# Patient Record
Sex: FEMALE | Race: WHITE | NOT HISPANIC OR LATINO | Employment: FULL TIME | URBAN - METROPOLITAN AREA
[De-identification: names, ages, dates, MRNs, and addresses within clinical notes are randomized per-mention and may not be internally consistent; named-entity substitution may affect disease eponyms.]

---
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
## 2020-05-27 ENCOUNTER — PROCEDURE VISIT (OUTPATIENT)
Dept: OBSTETRICS AND GYNECOLOGY | Facility: CLINIC | Age: 20
End: 2020-05-27

## 2020-05-27 VITALS
SYSTOLIC BLOOD PRESSURE: 118 MMHG | DIASTOLIC BLOOD PRESSURE: 68 MMHG | HEIGHT: 71 IN | HEART RATE: 78 BPM | TEMPERATURE: 98.6 F | WEIGHT: 293 LBS | BODY MASS INDEX: 41.02 KG/M2

## 2020-05-27 DIAGNOSIS — N63.10 LUMP OF RIGHT BREAST: ICD-10-CM

## 2020-05-27 DIAGNOSIS — Z97.5 BREAKTHROUGH BLEEDING ON NEXPLANON: Primary | ICD-10-CM

## 2020-05-27 DIAGNOSIS — Z30.46 ENCOUNTER FOR NEXPLANON REMOVAL: ICD-10-CM

## 2020-05-27 DIAGNOSIS — N92.1 BREAKTHROUGH BLEEDING ON NEXPLANON: Primary | ICD-10-CM

## 2020-05-27 PROCEDURE — 99213 OFFICE O/P EST LOW 20 MIN: CPT | Performed by: NURSE PRACTITIONER

## 2020-05-27 PROCEDURE — 11982 REMOVE DRUG IMPLANT DEVICE: CPT | Performed by: NURSE PRACTITIONER

## 2020-05-27 RX ORDER — TOPIRAMATE 100 MG/1
TABLET, FILM COATED ORAL
COMMUNITY
Start: 2020-04-27

## 2020-05-27 RX ORDER — OMEPRAZOLE 20 MG/1
CAPSULE, DELAYED RELEASE ORAL
COMMUNITY
Start: 2020-05-09

## 2020-05-27 NOTE — PROGRESS NOTES
Subjective   Isidra Sotelo is a 20 y.o. female.     History of Present Illness   Pt presents with concerns of a right breast lump and that her nexplanon is due to be removed.     Pt states she had Nexplanon placed in [place] by Guerline Johnson on 5/4/17. She did ok on it but struggled with irregular bleeding at times. She is only in town for a short time right now but will be moving here soon to take care of family. She requests that we go ahead with removal today. She declines any other contraception. She is not currently sexually active. She states in middle school she was put on estrogen tablets because she had prolonged amenorrhea followed by prolonged bleeding. She says her periods have never been regular and voices understanding that they could continue to be that way once Nexplanon is removed. See procedure note for details on removal.     Pt also has concerns about a lump in the right breast. Present x 1 month. It was a little tender but pt is unsure if it was tender or just her manipulation of it caused the tenderness. She has never had a breast lump before. No family hx of breast cancer or other problems. No skin changes, dimpling, rash, itching. No nipple discharge. No concerns with the left breast. She denies much caffeine intake at all.     The following portions of the patient's history were reviewed and updated as appropriate: allergies, current medications, past family history, past medical history, past social history, past surgical history and problem list.    Review of Systems   Constitutional: Negative.  Negative for chills and fever.   Respiratory: Negative.    Cardiovascular: Negative.    Gastrointestinal: Negative.    Genitourinary: Positive for breast lump and menstrual problem (irregular on Nexplanon). Negative for breast discharge, breast pain and pelvic pain.   Neurological: Negative for dizziness and seizures. Headache: migraines.   Psychiatric/Behavioral:        Struggles with  anxiety and depression       Objective   Physical Exam   Constitutional: She is oriented to person, place, and time. She appears well-developed and well-nourished. She is morbidly obese.  Cardiovascular: Normal rate, regular rhythm and normal heart sounds.   Pulmonary/Chest: Effort normal and breath sounds normal. Right breast exhibits mass (pea size nodule on the right breast). Right breast exhibits no inverted nipple, no nipple discharge, no skin change and no tenderness. Left breast exhibits no inverted nipple, no mass, no nipple discharge, no skin change and no tenderness. No breast swelling, tenderness, discharge or bleeding. Breasts are symmetrical.   Large breasts. Scattered fibroglandular tissue throughout both breasts.        Neurological: She is alert and oriented to person, place, and time.   Psychiatric: She has a normal mood and affect. Her behavior is normal.   Vitals reviewed.        Assessment/Plan   Isidra Chaidez was seen today for contraception and breast problem.    Diagnoses and all orders for this visit:    Breakthrough bleeding on Nexplanon    Encounter for Nexplanon removal    Lump of right breast  -     US Breast Right Limited; Future    Nexplanon was  so we moved forward with removal today. Pt declines any other hormonal contraceptive at this time. She voices complete understanding that she is no longer protected from pregnancy upon removal. She also understands that her periods could be very irregular. Given her hx of amenorrhea followed by prolonged bleeding, we discuss endometrial hyperplasia risks. I recommend pt call the office if she ever goes more than 3 months without a period. Pt voices understanding and agreement with this plan. We will follow up in 6 months to allow her periods time to potentially regulate on their own. We will also complete a well woman exam with pap testing that day.     I discussed findings on the breast with pt. I recommend a breast ultrasound. It is  likely cystic but we should evaluate to make sure. Pt agrees with that.     I will call with US results and discuss next steps PRN.

## 2020-05-27 NOTE — PROGRESS NOTES
Nexplanon Removal    Date of procedure:  5/27/2020    Risks and benefits discussed? yes  All questions answered? yes  Consents given by the patient  Written consent obtained? yes    Local anesthesia used:  yes - 2 cc's of  Meds; anesthesia local: 2% lidocaine    Procedure documentation:    The upper left arm (non-dominant) was marked at the intended site of removal.  Betadine was used to cleanse the skin.  Local anesthesia was injected.  A vertical incision was created at the distal tip of the implant.  The implant was removed intact without difficulty.  Steri-strips were then placed across the site of insertion and the arm was wrapped.    She tolerated the procedure well.  There were no complications.  EBL was minimal.    Post procedure instructions: Remove the wrapping in 24 hours and the steri-strips in 5 days.    Follow up needed: PRN    Diagnosis: Encounter for nexplanon removal, breakthrough bleeding on Nexplanon    This note was electronically signed.    Helena Ugalde, APRN  5/27/2020

## 2020-09-15 ENCOUNTER — OFFICE VISIT (OUTPATIENT)
Dept: OBSTETRICS AND GYNECOLOGY | Facility: CLINIC | Age: 20
End: 2020-09-15

## 2020-09-15 DIAGNOSIS — N94.6 DYSMENORRHEA: ICD-10-CM

## 2020-09-15 DIAGNOSIS — N92.0 MENORRHAGIA WITH REGULAR CYCLE: Primary | ICD-10-CM

## 2020-09-15 PROCEDURE — 99442 PR PHYS/QHP TELEPHONE EVALUATION 11-20 MIN: CPT | Performed by: NURSE PRACTITIONER

## 2020-09-15 RX ORDER — DROSPIRENONE AND ETHINYL ESTRADIOL 0.02-3(28)
1 KIT ORAL DAILY
Qty: 28 TABLET | Refills: 2 | Status: SHIPPED | OUTPATIENT
Start: 2020-09-15 | End: 2021-03-23

## 2020-09-16 NOTE — PROGRESS NOTES
Subjective   Isidra Sotelo is a 20 y.o. female.     You have chosen to receive care through a telephone visit. Do you consent to use a telephone visit for your medical care today? Yes    History of Present Illness   Pt presents, via telephone, to discuss concerns about her heavy painful periods worsening since Nexplanon removal. She has hx of irregular bleeding prior to Nexplanon. Did have BTB on Nexplanon too but they weren't horrible. I removed it 5/27/2020 due to expiration and pt wanted to just monitor her periods. She is not sexually active right now and doesn't have any intentions of being soon so she declined the need for contraception. Pt states since removal, her periods have been surprisingly regular. However, each one has gotten heavier and with more severe cramping to the point of missing out on work and ADLs. She is taking Tylenol ATC during her period with only minor relief. She soaked through an ultra tampon in 3 hours. Her mom also struggled with heavy periods when she was younger. Pt tried OCPs as a teen and had BTB, ultimately had to go on estrogen alone to get it to stop. She understands today's symptoms are a little different and is willing to try OCPs again.      The following portions of the patient's history were reviewed and updated as appropriate: allergies, current medications, past family history, past medical history, past social history, past surgical history and problem list.    Review of Systems   Constitutional: Negative.  Negative for chills, fatigue and fever.   Genitourinary: Positive for menstrual problem (heavy) and pelvic pain (dysmenorrhea).   Neurological: Negative for dizziness, syncope and light-headedness.       Objective   Physical Exam  Telephone    Assessment/Plan   Isidra Chaidez was seen today for menstrual problem.    Diagnoses and all orders for this visit:    Menorrhagia with regular cycle  -     drospirenone-ethinyl estradiol (ABDULLAHI) 3-0.02 MG per tablet; Take  1 tablet by mouth Daily.    Dysmenorrhea  -     drospirenone-ethinyl estradiol (ABDULLAHI) 3-0.02 MG per tablet; Take 1 tablet by mouth Daily.    We discussed options of OCP, NuvaRing, Depo Provera, IUD. Pt wants to use OCPs. She was instructed how to start her birth control.  It should be started today as quick start since she just finished her period and is not sexually active.  Because it may take 1 month to become effective, the use of alternative contraception for one month was stressed should she become active.  The potential for breakthrough bleeding for up to 3 cycles was also emphasized. Discussed what to do if 1 and 2 or more days of pills are missed. Mild side effects discussed. She will monitor her headaches and let me know if they are worsening. She voices understanding of the risk of topamax decreasing the effectiveness of her OCP. We may need to increase the dose if BTB occurs and she voices understanding of the need to use condoms always as pregnancy prevention may be decreased. All questions were answered. She is already scheduled for 10/27 for a well woman exam. We will also complete a FU on this OCP at that time also. Call office sooner PRN with any concerns or questions.     Total Time: 15 minutes

## 2020-10-27 ENCOUNTER — OFFICE VISIT (OUTPATIENT)
Dept: OBSTETRICS AND GYNECOLOGY | Facility: CLINIC | Age: 20
End: 2020-10-27

## 2020-10-27 ENCOUNTER — LAB (OUTPATIENT)
Dept: LAB | Facility: OTHER | Age: 20
End: 2020-10-27

## 2020-10-27 VITALS
HEIGHT: 71 IN | HEART RATE: 88 BPM | WEIGHT: 293 LBS | BODY MASS INDEX: 41.02 KG/M2 | SYSTOLIC BLOOD PRESSURE: 116 MMHG | DIASTOLIC BLOOD PRESSURE: 86 MMHG

## 2020-10-27 DIAGNOSIS — Z01.419 ENCOUNTER FOR GYNECOLOGICAL EXAMINATION WITH PAPANICOLAOU SMEAR OF CERVIX: Primary | ICD-10-CM

## 2020-10-27 DIAGNOSIS — Z30.41 ORAL CONTRACEPTIVE PILL SURVEILLANCE: ICD-10-CM

## 2020-10-27 PROCEDURE — 99395 PREV VISIT EST AGE 18-39: CPT | Performed by: NURSE PRACTITIONER

## 2020-10-27 NOTE — PROGRESS NOTES
Subjective   Chief Complaint   Patient presents with   • Gynecologic Exam     WWE   • Follow-up     OCP for menorrhagia with regular cycle      Isidra Sotelo is a 20 y.o. year old  presenting to be seen for her annual exam.  Today she has no complaints. This is her first pelvic exam and she is a little anxious. She requests mom come in the room whenever we do the pelvic exam. She started OCPs 6 weeks ago. Took x 1 month and did great. Bleeding was only 4 days, light, moderate cramping. She went out of town for a couple of weeks and didn't start her 2nd pack but she wants to restart it. She still has 2 refills at the pharmacy. She will start it the  after this next period. She has not been sexually active in 1 year.     Patient's last menstrual period was 10/06/2020 (exact date).    OB History        0    Para   0    Term   0       0    AB   0    Living   0       SAB   0    TAB   0    Ectopic   0    Molar   0    Multiple   0    Live Births   0                Current birth control method: OCP (estrogen/progesterone) and abstinence.    She is not currently sexually active.  In the past 12 months there has not been new sexual partners.  Condoms are not typically used.  She would not like to be screened for STD's at today's exam.     Past 6 month menstrual history:    Cycle Frequency: regular, predictable and consistent every 28 - 32 days   Menstrual cycle character: flow is typically light   Cycle Duration: 4 - 5   Number of heavy days of flows: 0   Dysmenorrhea: moderate and is not affecting her activities of daily living   PMS: is not affecting her activities of daily living   Intermenstrual bleeding present: no   Post-coital bleeding present: no     She exercises regularly: no.  She wears her seat belt:yes.  She has concerns about domestic violence: no.  Last colonoscopy: Never  Last DEXA: Never  Last MMG: Never  Last pap:  Never  History of abnormal PAP: N/A    The following  "portions of the patient's history were reviewed and updated as appropriate:problem list, current medications, allergies, past family history, past medical history, past social history and past surgical history.    Social History    Tobacco Use      Smoking status: Never Smoker      Smokeless tobacco: Never Used    Social History     Substance and Sexual Activity   Alcohol Use Yes   • Frequency: Monthly or less   • Drinks per session: 1 or 2   • Binge frequency: Never      Review of Systems   Constitutional: Negative.  Negative for chills and fever.   Respiratory: Negative.    Cardiovascular: Negative.    Gastrointestinal: Negative.  Negative for constipation and diarrhea.   Endocrine: Negative.    Genitourinary: Negative.  Negative for menstrual problem and pelvic pain.   Skin: Negative.    Neurological: Negative for dizziness, syncope, light-headedness and headaches (managed on topamax).   Psychiatric/Behavioral: Negative for suicidal ideas. Self-injury: about exam. The patient is not nervous/anxious.          Objective   /86   Pulse 88   Ht 180.3 cm (71\")   Wt (!) 137 kg (302 lb 9.6 oz)   LMP 10/06/2020 (Exact Date)   Breastfeeding No   BMI 42.20 kg/m²     General:  well developed; well nourished  no acute distress  obese - Body mass index is 42.2 kg/m².   Skin:  No suspicious lesions seen   Thyroid: not examined   Breasts:  Examined in supine position  Symmetric without masses or skin dimpling  Nipples normal without inversion, lesions or discharge  There are no palpable axillary nodes   Abdomen: soft, non-tender; no masses  no umbilical or inguinal hernias are present  no hepato-splenomegaly  Normal findings: bowel sounds normal   Cardiac: Heart sounds are normal.  Regular rate and rhythm without murmur, gallop or rub.   Resp: Normal expansion.  Clear to auscultation.  No rales, rhonchi, or wheezing.   Psych: alert,oriented, in NAD with a full range of affect, normal behavior and no psychotic " features. Mildly anxious about exam.    Pelvis: Uterus:  Exam limited by  body habitus  Clinical staff was present for exam  External genitalia:  normal appearance of the external genitalia including Bartholin's and Bressler's glands.  :  urethral meatus normal;  Vaginal:  normal pink mucosa without prolapse or lesions  Cervix:  normal appearance.  Uterus:  normal size, shape and consistency  Adnexa:  normal bimanual exam of the adnexa.  Rectal:  digital rectal exam not performed; anus visually normal appearing.     Lab Review   No data reviewed    Imaging  No data reviewed       Diagnoses and all orders for this visit:    1. Encounter for gynecological examination with Papanicolaou smear of cervix (Primary)  -     Liquid-based Pap Smear, Screening    2. Oral contraceptive pill surveillance    Pap results: I will send card in mail or call if abnormal. RTC annually for well woman exams.     Restart OCP the Sunday after her next period. Pt denies any concerns with it and was really pleased how much better her periods were. She denies worsening headaches, nausea or breast tenderness on it and wishes to stay on the same pill. She has 2 refills and will call when she gets on the last one so I can send a year's worth then.     All questions answered.     This note was electronically signed.    Helena Ugalde, APRN  October 27, 2020

## 2020-11-03 ENCOUNTER — OFFICE VISIT (OUTPATIENT)
Dept: FAMILY MEDICINE CLINIC | Facility: CLINIC | Age: 20
End: 2020-11-03

## 2020-11-03 VITALS
BODY MASS INDEX: 39.68 KG/M2 | DIASTOLIC BLOOD PRESSURE: 84 MMHG | HEIGHT: 72 IN | SYSTOLIC BLOOD PRESSURE: 122 MMHG | RESPIRATION RATE: 16 BRPM | HEART RATE: 98 BPM | OXYGEN SATURATION: 99 % | WEIGHT: 293 LBS

## 2020-11-03 DIAGNOSIS — R21 RASH: Primary | ICD-10-CM

## 2020-11-03 LAB
LAB AP CASE REPORT: NORMAL
PATH INTERP SPEC-IMP: NORMAL

## 2020-11-03 PROCEDURE — 99214 OFFICE O/P EST MOD 30 MIN: CPT | Performed by: NURSE PRACTITIONER

## 2020-11-03 RX ORDER — EPINEPHRINE 0.3 MG/.3ML
INJECTION SUBCUTANEOUS
COMMUNITY
End: 2021-03-23

## 2020-11-03 RX ORDER — CLOTRIMAZOLE AND BETAMETHASONE DIPROPIONATE 10; .64 MG/G; MG/G
CREAM TOPICAL 2 TIMES DAILY
Qty: 45 G | Refills: 0 | Status: SHIPPED | OUTPATIENT
Start: 2020-11-03 | End: 2021-03-23

## 2020-11-03 RX ORDER — PREDNISONE 10 MG/1
TABLET ORAL
Qty: 18 TABLET | Refills: 0 | Status: SHIPPED | OUTPATIENT
Start: 2020-11-03 | End: 2021-03-23

## 2020-11-03 NOTE — PROGRESS NOTES
Subjective   Isidra Kaylynn Sotelo is a 20 y.o. female who presents to the office to establish care.     History of Present Illness     Last labs: N/A    Moved when covid started from [place] to help take care of her grandparents. Mom and her both laid off for covid. , going to start school back in Jan for elementary education.     Rash-acute  X 1 week ago, no one else has a rash. No itching, sore to touch. About the same. Tried some type of rash cream/ointment from mom. Had wound cream from [employer]  with no changes. On front of thighs too. Looks like really bad razor burn, bunch of bumps. Just top parts of thigh.   -no change in laundry detergent. Always wash before wearing new stuff.     Hx of ezcema growing up.     Chronic allergic rhinitis-acute, not improving  -chronic since sophomore year of high school, grad 2017, sinus pressure and swelling really bad, all year round, allergy to dust, ragweed, and grass. Nasal spray prn that helps some, unsure of name. No itching, sneezing, or watery eyes.   -they were going to do shots vs drops but due to location, did drops. Tried several otc meds. No asthma related problems. No anaphylactic reaction.   -tested in three different categories decided to give epipen and start on immunotherapy. Food panel all negative.   -taking immunotherapy drops that you swallow for her allergies. Prescribed through ENT in [place]. Started immunotherapy towards the ent of October.   -take 6mtns to one year to notice that drops will work.     Migraine-chronic, controlled with topamax 200mg daily  -started same time as allergies, varies, several of them a week, 3-4x/week without topamax. Then after topamax, much better. But been off of topamax for a few weeks due to not having medications.   -chronic migraines and started going to ENT to see if this is the problem so hoping immunotherapy will help with migraines.     -only thing that works   -rebound migraine with excedrin  "migraine in march, in hospital x three days back in march. 500mg acetominophen/65mg caffeine-take two of those and then 81mg aspirin. Works pretty well if caught early. But if into migraine--needs really hot showers and then eat really good meal and that helps.   (hypoglycemia??)  Migraines sometimes related to not eating foods. hygoglycemia may be trigger     -with topamax can't taste carbonation (altered taste), flavor to carbonate is lost, tingling in fingers and toes, bad brain fog.      migraine clinic-telephone visit x twice. Last one can't function on it. Four hr nap after taking it. This was rizotriptan    -tried maxalt-just didn't work.     - imitrex is the last one she tried-makes her feel like her brain is on fire, acid going through veins.     -no auras, random onset, right sided usually, rarely left and in the back, throbbing/pressure like, not usually accompanied by light sensitivity but sometimes. Quiet place doesn't help.     -hx of having ct of head done or mri. One in high school was mri of brain \"brain sits further down closer to spinal cord\" causes you to be more prone to migraines and other of sinuses and it was clear.     -eyes checked less than a year ago, only slightly changed.     GERD-chronic, moderately controlled with omeprazole 40mg daily.  -upper GI scheduled in a couple of weeks for bariactric clearance. Really bad acid reflux. ??acid reflux migraines??   -still having breakthrough, just started on 40mg daily    kinga for birth control, bad menstraul cycles. Hx of arm implant nexplanon for years. Got removed several mtsn ago and periods got bad but periods bad. Hx of peer estrogen in eighth grade. Migraines unrelated.           F/U: N/A    _________________________________    Past Medical History:  N/A    Past Surgical History:   -left radius and growth plate fractured when younger and had surgery done  -left knee scope to remove plica in dec 2017    Past Family History:  -MGP-total " "above knee amputation, diabetes, CHF, melanoma  -mother-diabetes  -PGM-diabetes, hx of stage four thyroid cancer/removed and in remission.   -PGF-heart issues      Health Maintenance:   Unsure about gardisal  tdap in hs.  -pap was last week.     Social History:  No smoking      The following portions of the patient's history were reviewed and updated as appropriate: allergies, current medications, past family history, past medical history, past social history, past surgical history and problem list.    Review of Systems      Objective   /84 (BP Location: Left arm, Patient Position: Sitting, Cuff Size: Adult)   Pulse 98   Resp 16   Ht 182.9 cm (72\")   Wt (!) 140 kg (308 lb)   LMP 10/06/2020 (Exact Date)   SpO2 99%   BMI 41.77 kg/m²   Physical Exam     PHQ-2/PHQ-9 Depression Screening 11/3/2020   Little interest or pleasure in doing things 0   Feeling down, depressed, or hopeless 0   Total Score 0         Assessment/Plan   Diagnoses and all orders for this visit:    1. Rash (Primary)  -     clotrimazole-betamethasone (Lotrisone) 1-0.05 % cream; Apply  topically to the appropriate area as directed 2 (Two) Times a Day.  Dispense: 45 g; Refill: 0  -     predniSONE (DELTASONE) 10 MG tablet; Take [id number].  Dispense: 18 tablet; Refill: 0             Plan of Care:    Please See History of Present Illness(HPI) above for Plan of Care (POC) for individualized diagnoses as well as when to follow up.     Patient educated to follow-up sooner than next scheduled appointment if condition(s) worse or do not improve. Patient states understanding and is in agreeance with plan of care. An After Visit Summary was printed and given to the patient.      JASPAL Krueger        This document has been electronically signed by JASPAL Krueger on November 23, 2020 00:13 CST      EMR/Transcription Dragon Disclaimer:  Some of this note may be an electronic dragon " transcription/translation of spoken language to printed text. The electronic translation of spoken language may permit erroneous, or at times, nonsensical words or phrases to be inadvertently transcribed. Although I have reviewed the note for such errors, some may still exist.

## 2020-11-11 ENCOUNTER — OFFICE VISIT (OUTPATIENT)
Dept: FAMILY MEDICINE CLINIC | Facility: CLINIC | Age: 20
End: 2020-11-11

## 2020-11-11 VITALS
BODY MASS INDEX: 39.68 KG/M2 | DIASTOLIC BLOOD PRESSURE: 82 MMHG | RESPIRATION RATE: 16 BRPM | SYSTOLIC BLOOD PRESSURE: 125 MMHG | HEIGHT: 72 IN | WEIGHT: 293 LBS | HEART RATE: 97 BPM | TEMPERATURE: 98.4 F | OXYGEN SATURATION: 96 %

## 2020-11-11 DIAGNOSIS — J02.9 PHARYNGITIS, UNSPECIFIED ETIOLOGY: ICD-10-CM

## 2020-11-11 DIAGNOSIS — H66.93 ACUTE BILATERAL OTITIS MEDIA: Primary | ICD-10-CM

## 2020-11-11 PROCEDURE — 99213 OFFICE O/P EST LOW 20 MIN: CPT | Performed by: NURSE PRACTITIONER

## 2020-11-11 RX ORDER — AZITHROMYCIN 250 MG/1
TABLET, FILM COATED ORAL
Qty: 6 TABLET | Refills: 0 | Status: SHIPPED | OUTPATIENT
Start: 2020-11-11 | End: 2020-11-17

## 2020-11-11 NOTE — PROGRESS NOTES
CC: Earache      Subjective:  Isidra Sotelo is a 20 y.o. female who presents for     Earache   There is pain in both (moreso right than left) ears. This is a new problem. The current episode started today. The problem occurs constantly. The problem has been unchanged. There has been no fever. The pain is at a severity of 2/10. The pain is mild. Associated symptoms include a sore throat. Pertinent negatives include no abdominal pain, coughing, diarrhea, ear discharge, headaches, hearing loss, neck pain, rash, rhinorrhea or vomiting. Associated symptoms comments: Nasal congestion. She has tried nothing for the symptoms.   PCP is JASPAL Elliott    The following portions of the patient's history were reviewed and updated as appropriate: allergies, current medications, past family history, past medical history, past social history, past surgical history and problem list.    Past Medical History:   Diagnosis Date   • Acid reflux    • Anxiety    • Depression          Current Outpatient Medications:   •  clotrimazole-betamethasone (Lotrisone) 1-0.05 % cream, Apply  topically to the appropriate area as directed 2 (Two) Times a Day., Disp: 45 g, Rfl: 0  •  drospirenone-ethinyl estradiol (ABDULLAHI) 3-0.02 MG per tablet, Take 1 tablet by mouth Daily., Disp: 28 tablet, Rfl: 2  •  EPINEPHrine (EPIPEN) 0.3 MG/0.3ML solution auto-injector injection, Auvi-Q 0.3 mg/0.3 mL injection, auto-injector  Take 1 auto by injection route as directed., Disp: , Rfl:   •  omeprazole (priLOSEC) 20 MG capsule, Takes 40 mg daily, Disp: , Rfl:   •  topiramate (TOPAMAX) 100 MG tablet, , Disp: , Rfl:   •  azithromycin (Zithromax Z-Parth) 250 MG tablet, Take 2 tablets the first day, then 1 tablet daily for 4 days., Disp: 6 tablet, Rfl: 0  •  predniSONE (DELTASONE) 10 MG tablet, Take [id number]., Disp: 18 tablet, Rfl: 0    Review of Systems    Review of Systems   Constitutional: Negative.  Negative for fever.   HENT:  "Positive for congestion, ear pain and sore throat. Negative for ear discharge, hearing loss and rhinorrhea.    Eyes: Negative.    Respiratory: Negative.  Negative for cough.    Cardiovascular: Negative.    Gastrointestinal: Negative.  Negative for abdominal pain, diarrhea and vomiting.   Endocrine: Negative.    Genitourinary: Negative.    Musculoskeletal: Negative.  Negative for neck pain.   Skin: Negative.  Negative for rash.   Allergic/Immunologic: Negative.    Neurological: Negative.  Negative for headaches.   Hematological: Negative.    Psychiatric/Behavioral: Negative.    All other systems reviewed and are negative.      Objective  Vitals:    11/11/20 1405   BP: 125/82   Pulse: 97   Resp: 16   Temp: 98.4 °F (36.9 °C)   TempSrc: Oral   SpO2: 96%   Weight: (!) 137 kg (303 lb)   Height: 182.9 cm (72\")     Body mass index is 41.09 kg/m².    Physical Exam    Physical Exam  Vitals signs and nursing note reviewed.   Constitutional:       General: She is not in acute distress.     Appearance: Normal appearance. She is not ill-appearing, toxic-appearing or diaphoretic.   HENT:      Head: Normocephalic and atraumatic.      Right Ear: Ear canal and external ear normal. There is no impacted cerumen.      Left Ear: Ear canal and external ear normal. There is no impacted cerumen.      Ears:      Comments: Rt TM with erythema noted. Decreased light reflex. Lt TM with cloudiness noted.     Nose: Nose normal. No congestion or rhinorrhea.      Mouth/Throat:      Mouth: Mucous membranes are moist.      Pharynx: Posterior oropharyngeal erythema present. No oropharyngeal exudate.   Eyes:      General: No scleral icterus.        Right eye: No discharge.         Left eye: No discharge.      Extraocular Movements: Extraocular movements intact.      Conjunctiva/sclera: Conjunctivae normal.   Neck:      Musculoskeletal: Normal range of motion and neck supple. No neck rigidity or muscular tenderness.      Vascular: No carotid bruit. "   Cardiovascular:      Rate and Rhythm: Normal rate and regular rhythm.      Pulses: Normal pulses.      Heart sounds: Normal heart sounds. No murmur. No friction rub. No gallop.    Pulmonary:      Effort: Pulmonary effort is normal. No respiratory distress.      Breath sounds: Normal breath sounds. No stridor. No wheezing, rhonchi or rales.   Chest:      Chest wall: No tenderness.   Musculoskeletal:      Right lower leg: No edema.      Left lower leg: No edema.   Lymphadenopathy:      Cervical: No cervical adenopathy.   Skin:     General: Skin is warm and dry.      Capillary Refill: Capillary refill takes less than 2 seconds.      Coloration: Skin is not jaundiced or pale.      Findings: No bruising, erythema, lesion or rash.   Neurological:      General: No focal deficit present.      Mental Status: She is alert. Mental status is at baseline.   Psychiatric:         Mood and Affect: Mood normal.         Behavior: Behavior normal.         Thought Content: Thought content normal.         Judgment: Judgment normal.             Diagnoses and all orders for this visit:    1. Acute bilateral otitis media (Primary)  -     azithromycin (Zithromax Z-Parth) 250 MG tablet; Take 2 tablets the first day, then 1 tablet daily for 4 days.  Dispense: 6 tablet; Refill: 0    2. Pharyngitis, unspecified etiology  -     azithromycin (Zithromax Z-Parth) 250 MG tablet; Take 2 tablets the first day, then 1 tablet daily for 4 days.  Dispense: 6 tablet; Refill: 0       Will treat acute bilateral otitis media and pharyngitis with Zithromax as above.  Patient advised to take with food to help avoid upset stomach.  Advised to increase fluids.  To gargle warm salt water as needed for the sore throat.  Advised Tylenol or ibuprofen as needed.  Advised to rest when she can.  Advised if any acute worsening in symptoms to follow-up or go to the ER.  Otherwise, to follow-up as needed with PCP JASPAL Elliott.  Answered all questions.  Patient verbalized  understanding of plan of care.      This document has been electronically signed by JASPAL Garcia on November 11, 2020 14:22 CST

## 2020-11-11 NOTE — PATIENT INSTRUCTIONS
Otitis Media, Adult    Otitis media means that the middle ear is red and swollen (inflamed) and full of fluid. The condition usually goes away on its own.  Follow these instructions at home:  · Take over-the-counter and prescription medicines only as told by your doctor.  · If you were prescribed an antibiotic medicine, take it as told by your doctor. Do not stop taking the antibiotic even if you start to feel better.  · Keep all follow-up visits as told by your doctor. This is important.  Contact a doctor if:  · You have bleeding from your nose.  · There is a lump on your neck.  · You are not getting better in 5 days.  · You feel worse instead of better.  Get help right away if:  · You have pain that is not helped with medicine.  · You have swelling, redness, or pain around your ear.  · You get a stiff neck.  · You cannot move part of your face (paralyzed).  · You notice that the bone behind your ear hurts when you touch it.  · You get a very bad headache.  Summary  · Otitis media means that the middle ear is red, swollen, and full of fluid.  · This condition usually goes away on its own. In some cases, treatment may be needed.  · If you were prescribed an antibiotic medicine, take it as told by your doctor.  This information is not intended to replace advice given to you by your health care provider. Make sure you discuss any questions you have with your health care provider.  Document Released: 06/05/2009 Document Revised: 11/30/2018 Document Reviewed: 01/08/2018  Elsevier Patient Education © 2020 [employer]

## 2020-11-17 ENCOUNTER — OFFICE VISIT (OUTPATIENT)
Dept: FAMILY MEDICINE CLINIC | Facility: CLINIC | Age: 20
End: 2020-11-17

## 2020-11-17 VITALS — BODY MASS INDEX: 39.68 KG/M2 | WEIGHT: 293 LBS | HEIGHT: 72 IN

## 2020-11-17 DIAGNOSIS — G43.709 CHRONIC MIGRAINE WITHOUT AURA WITHOUT STATUS MIGRAINOSUS, NOT INTRACTABLE: ICD-10-CM

## 2020-11-17 DIAGNOSIS — J30.1 ALLERGIC RHINITIS DUE TO POLLEN, UNSPECIFIED SEASONALITY: ICD-10-CM

## 2020-11-17 DIAGNOSIS — H92.03 OTALGIA OF BOTH EARS: Primary | ICD-10-CM

## 2020-11-17 PROCEDURE — 99443 PR PHYS/QHP TELEPHONE EVALUATION 21-30 MIN: CPT | Performed by: NURSE PRACTITIONER

## 2020-11-17 RX ORDER — MONTELUKAST SODIUM 10 MG/1
10 TABLET ORAL NIGHTLY
Qty: 30 TABLET | Refills: 5 | Status: SHIPPED | OUTPATIENT
Start: 2020-11-17 | End: 2021-03-23

## 2020-11-17 RX ORDER — METHYLPREDNISOLONE 4 MG/1
TABLET ORAL
Qty: 1 EACH | Refills: 0 | Status: SHIPPED | OUTPATIENT
Start: 2020-11-17 | End: 2021-03-23

## 2020-11-17 RX ORDER — CEFDINIR 300 MG/1
300 CAPSULE ORAL 2 TIMES DAILY
Qty: 20 CAPSULE | Refills: 0 | Status: SHIPPED | OUTPATIENT
Start: 2020-11-17 | End: 2021-03-23

## 2020-11-17 RX ORDER — BUTALBITAL, ACETAMINOPHEN AND CAFFEINE 300; 40; 50 MG/1; MG/1; MG/1
1 CAPSULE ORAL EVERY 4 HOURS PRN
Qty: 30 CAPSULE | Refills: 0 | Status: SHIPPED | OUTPATIENT
Start: 2020-11-17

## 2020-11-17 RX ORDER — FLUCONAZOLE 150 MG/1
TABLET ORAL
Qty: 2 TABLET | Refills: 0 | Status: SHIPPED | OUTPATIENT
Start: 2020-11-17 | End: 2021-03-23

## 2021-01-11 ENCOUNTER — TRANSCRIBE ORDERS (OUTPATIENT)
Dept: GENERAL RADIOLOGY | Facility: CLINIC | Age: 21
End: 2021-01-11

## 2021-01-11 DIAGNOSIS — F98.8 ATTENTION DEFICIT DISORDER (ADD) IN ADULT: Primary | ICD-10-CM

## 2021-01-14 ENCOUNTER — LAB (OUTPATIENT)
Dept: LAB | Facility: OTHER | Age: 21
End: 2021-01-14

## 2021-01-14 ENCOUNTER — TRANSCRIBE ORDERS (OUTPATIENT)
Dept: LAB | Facility: OTHER | Age: 21
End: 2021-01-14

## 2021-01-14 DIAGNOSIS — E66.01 MORBID OBESITY (HCC): ICD-10-CM

## 2021-01-14 DIAGNOSIS — M54.9 DORSALGIA: ICD-10-CM

## 2021-01-14 DIAGNOSIS — R12 HEARTBURN: Primary | ICD-10-CM

## 2021-01-14 DIAGNOSIS — R12 HEARTBURN: ICD-10-CM

## 2021-01-14 DIAGNOSIS — F98.8 ATTENTION DEFICIT DISORDER (ADD) IN ADULT: ICD-10-CM

## 2021-01-14 LAB
25(OH)D3 SERPL-MCNC: 27 NG/ML (ref 30–100)
ALBUMIN SERPL-MCNC: 3.9 G/DL (ref 3.5–5)
ALBUMIN/GLOB SERPL: 1.3 G/DL (ref 1.1–1.8)
ALP SERPL-CCNC: 95 U/L (ref 38–126)
ALT SERPL W P-5'-P-CCNC: 16 U/L
AMPHET+METHAMPHET UR QL: NEGATIVE
AMPHETAMINES UR QL: NEGATIVE
ANION GAP SERPL CALCULATED.3IONS-SCNC: 8 MMOL/L (ref 5–15)
AST SERPL-CCNC: 17 U/L (ref 14–36)
BARBITURATES UR QL SCN: NEGATIVE
BENZODIAZ UR QL SCN: NEGATIVE
BILIRUB SERPL-MCNC: 0.4 MG/DL (ref 0.2–1.3)
BUN SERPL-MCNC: 9 MG/DL (ref 7–23)
BUN/CREAT SERPL: 16.1 (ref 7–25)
BUPRENORPHINE SERPL-MCNC: NEGATIVE NG/ML
CALCIUM SPEC-SCNC: 9.1 MG/DL (ref 8.4–10.2)
CANNABINOIDS SERPL QL: NEGATIVE
CHLORIDE SERPL-SCNC: 103 MMOL/L (ref 101–112)
CHOLEST SERPL-MCNC: 164 MG/DL (ref 150–200)
CO2 SERPL-SCNC: 27 MMOL/L (ref 22–30)
COCAINE UR QL: NEGATIVE
CREAT SERPL-MCNC: 0.56 MG/DL (ref 0.52–1.04)
DEPRECATED RDW RBC AUTO: 46 FL (ref 37–54)
ERYTHROCYTE [DISTWIDTH] IN BLOOD BY AUTOMATED COUNT: 16.5 % (ref 12.3–15.4)
FERRITIN SERPL-MCNC: 45.6 NG/ML (ref 13–150)
FOLATE SERPL-MCNC: 8.4 NG/ML (ref 4.78–24.2)
GFR SERPL CREATININE-BSD FRML MDRD: 137 ML/MIN/1.73 (ref 71–165)
GLOBULIN UR ELPH-MCNC: 3 GM/DL (ref 2.3–3.5)
GLUCOSE SERPL-MCNC: 98 MG/DL (ref 70–99)
HBA1C MFR BLD: 5.73 % (ref 4.8–5.6)
HCT VFR BLD AUTO: 40.9 % (ref 34–46.6)
HDLC SERPL-MCNC: 34 MG/DL (ref 40–59)
HGB BLD-MCNC: 13.2 G/DL (ref 12–15.9)
IRON 24H UR-MRATE: 73 MCG/DL (ref 37–145)
LDLC SERPL CALC-MCNC: 108 MG/DL
LDLC/HDLC SERPL: 3.12 {RATIO} (ref 0–3.22)
MAGNESIUM SERPL-MCNC: 1.9 MG/DL (ref 1.6–2.3)
MCH RBC QN AUTO: 25 PG (ref 26.6–33)
MCHC RBC AUTO-ENTMCNC: 32.3 G/DL (ref 31.5–35.7)
MCV RBC AUTO: 77.3 FL (ref 79–97)
METHADONE UR QL SCN: NEGATIVE
OPIATES UR QL: NEGATIVE
OXYCODONE UR QL SCN: NEGATIVE
PCP UR QL SCN: NEGATIVE
PHOSPHATE SERPL-MCNC: 3.3 MG/DL (ref 2.5–4.5)
PLATELET # BLD AUTO: 314 10*3/MM3 (ref 140–450)
PMV BLD AUTO: 9.7 FL (ref 6–12)
POTASSIUM SERPL-SCNC: 4.1 MMOL/L (ref 3.4–5)
PREALB SERPL-MCNC: 19.1 MG/DL (ref 20–40)
PROPOXYPH UR QL: NEGATIVE
PROT SERPL-MCNC: 6.9 G/DL (ref 6.3–8.6)
PTH-INTACT SERPL-MCNC: 30.5 PG/ML (ref 15–65)
RBC # BLD AUTO: 5.29 10*6/MM3 (ref 3.77–5.28)
SODIUM SERPL-SCNC: 138 MMOL/L (ref 137–145)
TRICYCLICS UR QL SCN: NEGATIVE
TRIGL SERPL-MCNC: 119 MG/DL
TSH SERPL DL<=0.05 MIU/L-ACNC: 2.71 UIU/ML (ref 0.27–4.2)
VLDLC SERPL-MCNC: 22 MG/DL (ref 5–40)
WBC # BLD AUTO: 8.28 10*3/MM3 (ref 3.4–10.8)

## 2021-01-14 PROCEDURE — 83540 ASSAY OF IRON: CPT | Performed by: PHYSICIAN ASSISTANT

## 2021-01-14 PROCEDURE — 82728 ASSAY OF FERRITIN: CPT | Performed by: PHYSICIAN ASSISTANT

## 2021-01-14 PROCEDURE — 83735 ASSAY OF MAGNESIUM: CPT | Performed by: INTERNAL MEDICINE

## 2021-01-14 PROCEDURE — 80306 DRUG TEST PRSMV INSTRMNT: CPT | Performed by: FAMILY MEDICINE

## 2021-01-14 PROCEDURE — 84100 ASSAY OF PHOSPHORUS: CPT | Performed by: INTERNAL MEDICINE

## 2021-01-14 PROCEDURE — 82306 VITAMIN D 25 HYDROXY: CPT | Performed by: PHYSICIAN ASSISTANT

## 2021-01-14 PROCEDURE — 83921 ORGANIC ACID SINGLE QUANT: CPT | Performed by: PHYSICIAN ASSISTANT

## 2021-01-14 PROCEDURE — 84134 ASSAY OF PREALBUMIN: CPT | Performed by: PHYSICIAN ASSISTANT

## 2021-01-14 PROCEDURE — 84590 ASSAY OF VITAMIN A: CPT | Performed by: PHYSICIAN ASSISTANT

## 2021-01-14 PROCEDURE — 85027 COMPLETE CBC AUTOMATED: CPT | Performed by: INTERNAL MEDICINE

## 2021-01-14 PROCEDURE — 83036 HEMOGLOBIN GLYCOSYLATED A1C: CPT | Performed by: PHYSICIAN ASSISTANT

## 2021-01-14 PROCEDURE — 82746 ASSAY OF FOLIC ACID SERUM: CPT | Performed by: PHYSICIAN ASSISTANT

## 2021-01-14 PROCEDURE — 84443 ASSAY THYROID STIM HORMONE: CPT | Performed by: PHYSICIAN ASSISTANT

## 2021-01-14 PROCEDURE — 84446 ASSAY OF VITAMIN E: CPT | Performed by: PHYSICIAN ASSISTANT

## 2021-01-14 PROCEDURE — 80061 LIPID PANEL: CPT | Performed by: INTERNAL MEDICINE

## 2021-01-14 PROCEDURE — 83970 ASSAY OF PARATHORMONE: CPT | Performed by: PHYSICIAN ASSISTANT

## 2021-01-14 PROCEDURE — 80053 COMPREHEN METABOLIC PANEL: CPT | Performed by: INTERNAL MEDICINE

## 2021-01-14 PROCEDURE — 84425 ASSAY OF VITAMIN B-1: CPT | Performed by: PHYSICIAN ASSISTANT

## 2021-01-18 ENCOUNTER — TRANSCRIBE ORDERS (OUTPATIENT)
Dept: LAB | Facility: OTHER | Age: 21
End: 2021-01-18

## 2021-01-18 DIAGNOSIS — E66.01 MORBID OBESITY (HCC): Primary | ICD-10-CM

## 2021-01-19 LAB
A-TOCOPHEROL VIT E SERPL-MCNC: 7.7 MG/L (ref 5.9–19.4)
GAMMA TOCOPHEROL SERPL-MCNC: 2 MG/L (ref 0.7–4.9)
VIT A SERPL-MCNC: 30.5 UG/DL (ref 18.9–57.3)

## 2021-01-22 LAB — VIT B1 BLD-SCNC: 141.3 NMOL/L (ref 66.5–200)

## 2021-01-26 LAB
Lab: NORMAL
METHYLMALONATE SERPL-SCNC: 131 NMOL/L (ref 0–378)

## 2021-03-23 ENCOUNTER — OFFICE VISIT (OUTPATIENT)
Dept: OBSTETRICS AND GYNECOLOGY | Facility: CLINIC | Age: 21
End: 2021-03-23

## 2021-03-23 VITALS
SYSTOLIC BLOOD PRESSURE: 124 MMHG | BODY MASS INDEX: 39.68 KG/M2 | HEIGHT: 72 IN | HEART RATE: 100 BPM | DIASTOLIC BLOOD PRESSURE: 88 MMHG | WEIGHT: 293 LBS

## 2021-03-23 DIAGNOSIS — N91.2 AMENORRHEA: Primary | ICD-10-CM

## 2021-03-23 DIAGNOSIS — Z30.09 GENERAL COUNSELING AND ADVICE FOR CONTRACEPTIVE MANAGEMENT: ICD-10-CM

## 2021-03-23 LAB
B-HCG UR QL: NEGATIVE
INTERNAL NEGATIVE CONTROL: NEGATIVE
INTERNAL POSITIVE CONTROL: POSITIVE
Lab: NORMAL

## 2021-03-23 PROCEDURE — 99213 OFFICE O/P EST LOW 20 MIN: CPT | Performed by: NURSE PRACTITIONER

## 2021-03-23 PROCEDURE — 81025 URINE PREGNANCY TEST: CPT | Performed by: NURSE PRACTITIONER

## 2021-03-23 RX ORDER — DEXTROAMPHETAMINE SULFATE, DEXTROAMPHETAMINE SACCHARATE, AMPHETAMINE SULFATE AND AMPHETAMINE ASPARTATE 7.5; 7.5; 7.5; 7.5 MG/1; MG/1; MG/1; MG/1
CAPSULE, EXTENDED RELEASE ORAL
COMMUNITY
Start: 2021-02-18

## 2021-03-23 RX ORDER — BUTALBITAL AND ACETAMINOPHEN 25; 325 MG/1; MG/1
TABLET ORAL EVERY 4 HOURS
COMMUNITY

## 2021-03-23 RX ORDER — MEDROXYPROGESTERONE ACETATE 5 MG/1
5 TABLET ORAL DAILY
Qty: 10 TABLET | Refills: 0 | Status: SHIPPED | OUTPATIENT
Start: 2021-03-23

## 2021-03-23 RX ORDER — DEXTROAMPHETAMINE SACCHARATE, AMPHETAMINE ASPARTATE, DEXTROAMPHETAMINE SULFATE AND AMPHETAMINE SULFATE 7.5; 7.5; 7.5; 7.5 MG/1; MG/1; MG/1; MG/1
TABLET ORAL
COMMUNITY
Start: 2021-01-30

## 2021-03-24 NOTE — PROGRESS NOTES
Subjective   Isidra Sotelo is a 21 y.o. female.     History of Present Illness   Pt presents with concerns about missed periods since October or November. Last time I saw pt in October she was on OCPs and doing ok on them. She states she only took them a couple of months and stopped taking them when she didn't have any bleeding on the 2nd pack. She has hx of skipping periods and then having heavy, prolonged bleeding. Pt has not been sexually active. UPT is negative. Pt is thinking more about long term management of her periods and is considering an IUD. Given that she is on topamax, this may be the best option.  Pt has lost about 10lb. She is taking online classes and taking care of her grandfather, so she is under some significant stress. She has completed the work up for bariatric surgery and wants to consider something that will be very effective and low maintenance.     The following portions of the patient's history were reviewed and updated as appropriate: allergies, current medications, past family history, past medical history, past social history, past surgical history and problem list.    Review of Systems   Constitutional: Negative.  Negative for chills, fever, unexpected weight gain and unexpected weight loss.   Respiratory: Negative.    Cardiovascular: Negative.    Genitourinary: Positive for menstrual problem (amenorrhea). Negative for pelvic pain, vaginal bleeding and vaginal discharge.   Neurological: Negative for dizziness, syncope and light-headedness.   Psychiatric/Behavioral: Positive for stress.       Objective   Physical Exam  Vitals reviewed.   Constitutional:       Appearance: She is morbidly obese.   Cardiovascular:      Rate and Rhythm: Normal rate and regular rhythm.      Heart sounds: Normal heart sounds.   Pulmonary:      Effort: Pulmonary effort is normal.      Breath sounds: Normal breath sounds.   Neurological:      Mental Status: She is alert and oriented to person, place,  and time.   Psychiatric:         Mood and Affect: Mood normal.         Behavior: Behavior normal.           Assessment/Plan   Diagnoses and all orders for this visit:    1. Amenorrhea (Primary)  -     medroxyPROGESTERone (Provera) 5 MG tablet; Take 1 tablet by mouth Daily.  Dispense: 10 tablet; Refill: 0  -     POC Pregnancy, Urine    2. General counseling and advice for contraceptive management    UPT is negative. Pt will begin a provera challenge to induce a bleed. I warned pt that it may be heavy and have cramping. Warned to go to the ER if she is saturating through a tampon or pad every single hour. Pt voices understanding. She will call and let us know when she starts her period so that we can document that.     After having this period, I recommend she wait and see if her normal periods return. She is not currently/doesn't plan to be sexually active anytime soon. She is considering Mirena IUD. We would place either on her next natural period or if that doesn't occur, we will induce another period with provera and place then. Pt voices understanding and agreement with this plan of care.     Should her bariatric surgery be scheduled during this transition, she will let us know and we will work around that.

## 2021-04-05 ENCOUNTER — LAB (OUTPATIENT)
Dept: LAB | Facility: OTHER | Age: 21
End: 2021-04-05

## 2021-04-05 DIAGNOSIS — N92.1 MENORRHAGIA WITH IRREGULAR CYCLE: Primary | ICD-10-CM

## 2021-04-05 LAB
BASOPHILS # BLD AUTO: 0.05 10*3/MM3 (ref 0–0.2)
BASOPHILS NFR BLD AUTO: 0.4 % (ref 0–1.5)
DEPRECATED RDW RBC AUTO: 44.5 FL (ref 37–54)
EOSINOPHIL # BLD AUTO: 0.06 10*3/MM3 (ref 0–0.4)
EOSINOPHIL NFR BLD AUTO: 0.5 % (ref 0.3–6.2)
ERYTHROCYTE [DISTWIDTH] IN BLOOD BY AUTOMATED COUNT: 15.6 % (ref 12.3–15.4)
HCT VFR BLD AUTO: 39.1 % (ref 34–46.6)
HGB BLD-MCNC: 12.9 G/DL (ref 12–15.9)
LYMPHOCYTES # BLD AUTO: 3.14 10*3/MM3 (ref 0.7–3.1)
LYMPHOCYTES NFR BLD AUTO: 28.2 % (ref 19.6–45.3)
MCH RBC QN AUTO: 25.9 PG (ref 26.6–33)
MCHC RBC AUTO-ENTMCNC: 33 G/DL (ref 31.5–35.7)
MCV RBC AUTO: 78.4 FL (ref 79–97)
MONOCYTES # BLD AUTO: 0.46 10*3/MM3 (ref 0.1–0.9)
MONOCYTES NFR BLD AUTO: 4.1 % (ref 5–12)
NEUTROPHILS NFR BLD AUTO: 66.8 % (ref 42.7–76)
NEUTROPHILS NFR BLD AUTO: 7.43 10*3/MM3 (ref 1.7–7)
PLATELET # BLD AUTO: 383 10*3/MM3 (ref 140–450)
PMV BLD AUTO: 9.5 FL (ref 6–12)
RBC # BLD AUTO: 4.99 10*6/MM3 (ref 3.77–5.28)
WBC # BLD AUTO: 11.14 10*3/MM3 (ref 3.4–10.8)

## 2021-04-05 PROCEDURE — 85025 COMPLETE CBC W/AUTO DIFF WBC: CPT | Performed by: NURSE PRACTITIONER

## 2021-04-05 NOTE — PROGRESS NOTES
Patient was recently seen for amenorrhea. [place] put her on provera since her LMP was October or November of 2020. Patient called today and said her LMP started 04/02/2021. She is going through an ultra tampon every hour and having large clots. She is feeling shaky and feeling light headed. She described it as how you feel when you need to eat. I asked her If she has been eating and drinking and she said yes. Does feel better when sitting. Patient is going to have someone bring her to clinic for a STAT cbc. Will call patient with results today.

## 2021-04-06 DIAGNOSIS — G43.709 CHRONIC MIGRAINE WITHOUT AURA WITHOUT STATUS MIGRAINOSUS, NOT INTRACTABLE: ICD-10-CM

## 2021-04-06 RX ORDER — BUTALBITAL, ACETAMINOPHEN AND CAFFEINE 300; 40; 50 MG/1; MG/1; MG/1
1 CAPSULE ORAL EVERY 4 HOURS PRN
Qty: 30 CAPSULE | Refills: 0 | OUTPATIENT
Start: 2021-04-06

## 2021-04-07 ENCOUNTER — DOCUMENTATION (OUTPATIENT)
Dept: OBSTETRICS AND GYNECOLOGY | Facility: CLINIC | Age: 21
End: 2021-04-07

## 2021-04-07 NOTE — PROGRESS NOTES
I spoke with pt on 4/6 around 4PM. Pt states her flow has lightened today. Still using ultra tampons but only having to change every few hours or so. Not hourly like she was yesterday. She denies feeling dizzy or having any syncope. She will continue to monitor and let me know if that changes or go to the ER after hours.     WBC   Date Value Ref Range Status   04/05/2021 11.14 (H) 3.40 - 10.80 10*3/mm3 Final     RBC   Date Value Ref Range Status   04/05/2021 4.99 3.77 - 5.28 10*6/mm3 Final     Hemoglobin   Date Value Ref Range Status   04/05/2021 12.9 12.0 - 15.9 g/dL Final     Hematocrit   Date Value Ref Range Status   04/05/2021 39.1 34.0 - 46.6 % Final     MCV   Date Value Ref Range Status   04/05/2021 78.4 (L) 79.0 - 97.0 fL Final     MCH   Date Value Ref Range Status   04/05/2021 25.9 (L) 26.6 - 33.0 pg Final     MCHC   Date Value Ref Range Status   04/05/2021 33.0 31.5 - 35.7 g/dL Final     RDW   Date Value Ref Range Status   04/05/2021 15.6 (H) 12.3 - 15.4 % Final     RDW-SD   Date Value Ref Range Status   04/05/2021 44.5 37.0 - 54.0 fl Final     MPV   Date Value Ref Range Status   04/05/2021 9.5 6.0 - 12.0 fL Final     Platelets   Date Value Ref Range Status   04/05/2021 383 140 - 450 10*3/mm3 Final     Neutrophil %   Date Value Ref Range Status   04/05/2021 66.8 42.7 - 76.0 % Final     Lymphocyte %   Date Value Ref Range Status   04/05/2021 28.2 19.6 - 45.3 % Final     Monocyte %   Date Value Ref Range Status   04/05/2021 4.1 (L) 5.0 - 12.0 % Final     Eosinophil %   Date Value Ref Range Status   04/05/2021 0.5 0.3 - 6.2 % Final     Basophil %   Date Value Ref Range Status   04/05/2021 0.4 0.0 - 1.5 % Final     Neutrophils, Absolute   Date Value Ref Range Status   04/05/2021 7.43 (H) 1.70 - 7.00 10*3/mm3 Final     Lymphocytes, Absolute   Date Value Ref Range Status   04/05/2021 3.14 (H) 0.70 - 3.10 10*3/mm3 Final     Monocytes, Absolute   Date Value Ref Range Status   04/05/2021 0.46 0.10 - 0.90 10*3/mm3  Final     Eosinophils, Absolute   Date Value Ref Range Status   04/05/2021 0.06 0.00 - 0.40 10*3/mm3 Final     Basophils, Absolute   Date Value Ref Range Status   04/05/2021 0.05 0.00 - 0.20 10*3/mm3 Final

## 2021-05-04 ENCOUNTER — DOCUMENTATION (OUTPATIENT)
Dept: OBSTETRICS AND GYNECOLOGY | Facility: CLINIC | Age: 21
End: 2021-05-04

## 2021-05-04 NOTE — PROGRESS NOTES
I called patient to see if she had a period after taking provera. She said she did have a period around the beginning of April but has not had one on her own this month yet. We were waiting to see if she did to place mirena. Patient said she is scheduled for bariatric surgery May 10th. She is to call us after she recovers to schedule mirena insertion. If she isn't having  periods on her own we will send in provera to take.

## 2021-09-07 ENCOUNTER — DOCUMENTATION (OUTPATIENT)
Dept: OBSTETRICS AND GYNECOLOGY | Facility: CLINIC | Age: 21
End: 2021-09-07

## 2021-09-07 NOTE — PROGRESS NOTES
Patient has moved out of town and doesn't want the Mirena. When she gets established with a provider where she lives she might look into getting one then.

## 2022-03-22 ENCOUNTER — APPOINTMENT (OUTPATIENT)
Dept: CT IMAGING | Facility: HOSPITAL | Age: 22
End: 2022-03-22

## 2022-03-22 ENCOUNTER — HOSPITAL ENCOUNTER (EMERGENCY)
Facility: HOSPITAL | Age: 22
Discharge: HOME | End: 2022-03-22
Attending: EMERGENCY MEDICINE | Admitting: EMERGENCY MEDICINE

## 2022-03-22 ENCOUNTER — APPOINTMENT (OUTPATIENT)
Dept: ULTRASOUND IMAGING | Facility: HOSPITAL | Age: 22
End: 2022-03-22

## 2022-03-22 VITALS
WEIGHT: 180 LBS | SYSTOLIC BLOOD PRESSURE: 134 MMHG | OXYGEN SATURATION: 100 % | DIASTOLIC BLOOD PRESSURE: 91 MMHG | RESPIRATION RATE: 16 BRPM | TEMPERATURE: 97.3 F | HEART RATE: 63 BPM | HEIGHT: 72 IN | BODY MASS INDEX: 24.38 KG/M2

## 2022-03-22 DIAGNOSIS — K80.20 CALCULUS OF GALLBLADDER WITHOUT CHOLECYSTITIS WITHOUT OBSTRUCTION: Primary | ICD-10-CM

## 2022-03-22 LAB
ALBUMIN SERPL-MCNC: 4.4 G/DL (ref 3.5–5.2)
ALBUMIN/GLOB SERPL: 1.8 G/DL
ALP SERPL-CCNC: 90 U/L (ref 39–117)
ALT SERPL W P-5'-P-CCNC: 10 U/L (ref 1–33)
ANION GAP SERPL CALCULATED.3IONS-SCNC: 12 MMOL/L (ref 5–15)
AST SERPL-CCNC: 13 U/L (ref 1–32)
B-HCG UR QL: NEGATIVE
BACTERIA UR QL AUTO: ABNORMAL /HPF
BASOPHILS # BLD AUTO: 0.03 10*3/MM3 (ref 0–0.2)
BASOPHILS NFR BLD AUTO: 0.3 % (ref 0–1.5)
BILIRUB SERPL-MCNC: 0.6 MG/DL (ref 0–1.2)
BILIRUB UR QL STRIP: NEGATIVE
BUN SERPL-MCNC: 8 MG/DL (ref 6–20)
BUN/CREAT SERPL: 14 (ref 7–25)
CALCIUM SPEC-SCNC: 9.3 MG/DL (ref 8.6–10.5)
CHLORIDE SERPL-SCNC: 106 MMOL/L (ref 98–107)
CLARITY UR: ABNORMAL
CO2 SERPL-SCNC: 21 MMOL/L (ref 22–29)
COLOR UR: YELLOW
CREAT SERPL-MCNC: 0.57 MG/DL (ref 0.57–1)
DEPRECATED RDW RBC AUTO: 46.5 FL (ref 37–54)
EGFRCR SERPLBLD CKD-EPI 2021: 131.1 ML/MIN/1.73
EOSINOPHIL # BLD AUTO: 0.02 10*3/MM3 (ref 0–0.4)
EOSINOPHIL NFR BLD AUTO: 0.2 % (ref 0.3–6.2)
ERYTHROCYTE [DISTWIDTH] IN BLOOD BY AUTOMATED COUNT: 16.3 % (ref 12.3–15.4)
EXPIRATION DATE: NORMAL
GLOBULIN UR ELPH-MCNC: 2.5 GM/DL
GLUCOSE SERPL-MCNC: 102 MG/DL (ref 65–99)
GLUCOSE UR STRIP-MCNC: NEGATIVE MG/DL
HCT VFR BLD AUTO: 37.9 % (ref 34–46.6)
HGB BLD-MCNC: 12.3 G/DL (ref 12–15.9)
HGB UR QL STRIP.AUTO: NEGATIVE
HYALINE CASTS UR QL AUTO: ABNORMAL /LPF
IMM GRANULOCYTES # BLD AUTO: 0.04 10*3/MM3 (ref 0–0.05)
IMM GRANULOCYTES NFR BLD AUTO: 0.4 % (ref 0–0.5)
INTERNAL NEGATIVE CONTROL: NEGATIVE
INTERNAL POSITIVE CONTROL: POSITIVE
KETONES UR QL STRIP: ABNORMAL
LEUKOCYTE ESTERASE UR QL STRIP.AUTO: NEGATIVE
LIPASE SERPL-CCNC: 23 U/L (ref 13–60)
LYMPHOCYTES # BLD AUTO: 1.6 10*3/MM3 (ref 0.7–3.1)
LYMPHOCYTES NFR BLD AUTO: 15.3 % (ref 19.6–45.3)
Lab: NORMAL
MCH RBC QN AUTO: 25.6 PG (ref 26.6–33)
MCHC RBC AUTO-ENTMCNC: 32.5 G/DL (ref 31.5–35.7)
MCV RBC AUTO: 79 FL (ref 79–97)
MONOCYTES # BLD AUTO: 0.46 10*3/MM3 (ref 0.1–0.9)
MONOCYTES NFR BLD AUTO: 4.4 % (ref 5–12)
NEUTROPHILS NFR BLD AUTO: 79.4 % (ref 42.7–76)
NEUTROPHILS NFR BLD AUTO: 8.32 10*3/MM3 (ref 1.7–7)
NITRITE UR QL STRIP: NEGATIVE
NRBC BLD AUTO-RTO: 0 /100 WBC (ref 0–0.2)
PH UR STRIP.AUTO: 7.5 [PH] (ref 5–8)
PLATELET # BLD AUTO: 264 10*3/MM3 (ref 140–450)
PMV BLD AUTO: 10.4 FL (ref 6–12)
POTASSIUM SERPL-SCNC: 3.5 MMOL/L (ref 3.5–5.2)
PROT SERPL-MCNC: 6.9 G/DL (ref 6–8.5)
PROT UR QL STRIP: ABNORMAL
RBC # BLD AUTO: 4.8 10*6/MM3 (ref 3.77–5.28)
RBC # UR STRIP: ABNORMAL /HPF
REF LAB TEST METHOD: ABNORMAL
SODIUM SERPL-SCNC: 139 MMOL/L (ref 136–145)
SP GR UR STRIP: 1.03 (ref 1–1.03)
SQUAMOUS #/AREA URNS HPF: ABNORMAL /HPF
UROBILINOGEN UR QL STRIP: ABNORMAL
WBC # UR STRIP: ABNORMAL /HPF
WBC NRBC COR # BLD: 10.47 10*3/MM3 (ref 3.4–10.8)

## 2022-03-22 PROCEDURE — 74177 CT ABD & PELVIS W/CONTRAST: CPT

## 2022-03-22 PROCEDURE — 96375 TX/PRO/DX INJ NEW DRUG ADDON: CPT

## 2022-03-22 PROCEDURE — 83690 ASSAY OF LIPASE: CPT | Performed by: EMERGENCY MEDICINE

## 2022-03-22 PROCEDURE — 81025 URINE PREGNANCY TEST: CPT | Performed by: EMERGENCY MEDICINE

## 2022-03-22 PROCEDURE — 0 IOPAMIDOL PER 1 ML: Performed by: EMERGENCY MEDICINE

## 2022-03-22 PROCEDURE — 25010000002 ONDANSETRON PER 1 MG: Performed by: EMERGENCY MEDICINE

## 2022-03-22 PROCEDURE — 99283 EMERGENCY DEPT VISIT LOW MDM: CPT

## 2022-03-22 PROCEDURE — 25010000002 HYDROMORPHONE 1 MG/ML SOLUTION: Performed by: EMERGENCY MEDICINE

## 2022-03-22 PROCEDURE — 96374 THER/PROPH/DIAG INJ IV PUSH: CPT

## 2022-03-22 PROCEDURE — 85025 COMPLETE CBC W/AUTO DIFF WBC: CPT | Performed by: EMERGENCY MEDICINE

## 2022-03-22 PROCEDURE — 71275 CT ANGIOGRAPHY CHEST: CPT

## 2022-03-22 PROCEDURE — 81001 URINALYSIS AUTO W/SCOPE: CPT | Performed by: EMERGENCY MEDICINE

## 2022-03-22 PROCEDURE — 96376 TX/PRO/DX INJ SAME DRUG ADON: CPT

## 2022-03-22 PROCEDURE — 25010000002 ONDANSETRON PER 1 MG: Performed by: NURSE PRACTITIONER

## 2022-03-22 PROCEDURE — 76705 ECHO EXAM OF ABDOMEN: CPT

## 2022-03-22 PROCEDURE — 80053 COMPREHEN METABOLIC PANEL: CPT | Performed by: EMERGENCY MEDICINE

## 2022-03-22 RX ORDER — SODIUM CHLORIDE 0.9 % (FLUSH) 0.9 %
10 SYRINGE (ML) INJECTION AS NEEDED
Status: DISCONTINUED | OUTPATIENT
Start: 2022-03-22 | End: 2022-03-22 | Stop reason: HOSPADM

## 2022-03-22 RX ORDER — HYDROCODONE BITARTRATE AND ACETAMINOPHEN 5; 325 MG/1; MG/1
1 TABLET ORAL EVERY 6 HOURS PRN
Qty: 12 TABLET | Refills: 0 | Status: SHIPPED | OUTPATIENT
Start: 2022-03-22

## 2022-03-22 RX ORDER — ONDANSETRON 2 MG/ML
4 INJECTION INTRAMUSCULAR; INTRAVENOUS ONCE
Status: COMPLETED | OUTPATIENT
Start: 2022-03-22 | End: 2022-03-22

## 2022-03-22 RX ADMIN — SODIUM CHLORIDE 1000 ML: 9 INJECTION, SOLUTION INTRAVENOUS at 10:16

## 2022-03-22 RX ADMIN — ONDANSETRON 4 MG: 2 INJECTION INTRAMUSCULAR; INTRAVENOUS at 13:56

## 2022-03-22 RX ADMIN — ONDANSETRON 4 MG: 2 INJECTION INTRAMUSCULAR; INTRAVENOUS at 10:16

## 2022-03-22 RX ADMIN — HYDROMORPHONE HYDROCHLORIDE 1 MG: 1 INJECTION, SOLUTION INTRAMUSCULAR; INTRAVENOUS; SUBCUTANEOUS at 12:22

## 2022-03-22 RX ADMIN — IOPAMIDOL 100 ML: 755 INJECTION, SOLUTION INTRAVENOUS at 11:07

## 2022-03-22 RX ADMIN — HYDROMORPHONE HYDROCHLORIDE 1 MG: 1 INJECTION, SOLUTION INTRAMUSCULAR; INTRAVENOUS; SUBCUTANEOUS at 10:15

## 2022-03-22 NOTE — ED PROVIDER NOTES
Subjective   Isidra Sotelo is a 22 yr old female that presents emergency department with complaints of upper abdominal pain.  Pain radiates around into the mid back.  She complains of nausea, vomiting.  Patient denies any diarrhea.  Pain is currently at 8 out of 10.  Patient is unable to get comfortable.  She is doubled over in discomfort.  Patient reports a history of a gastric sleeve her surgeon is in [place].  She denies chest pain, shortness of breath.  Negative for any urinary frequency, urgency.      History provided by:  Patient   used: No    Abdominal Pain  Pain location:  LUQ and RUQ  Pain quality: sharp and stabbing    Pain radiates to:  Back  Pain severity:  Moderate  Timing:  Constant  Progression:  Worsening  Associated symptoms: diarrhea, nausea and vomiting    Associated symptoms: no chest pain, no chills, no cough, no dysuria, no fever and no shortness of breath        Review of Systems   Constitutional: Negative for chills and fever.   Respiratory: Negative for cough and shortness of breath.    Cardiovascular: Negative for chest pain.   Gastrointestinal: Positive for abdominal pain, diarrhea, nausea and vomiting.   Genitourinary: Negative for dysuria and urgency.   Musculoskeletal: Positive for back pain.   Neurological: Negative for dizziness and weakness.   All other systems reviewed and are negative.      Past Medical History:   Diagnosis Date   • Acid reflux    • Anxiety    • Depression    • Migraine        Allergies   Allergen Reactions   • Penicillins Hives       Past Surgical History:   Procedure Laterality Date   • KNEE ARTHROSCOPY Left 2017   • WISDOM TOOTH EXTRACTION     • WRIST SURGERY Left 2009       Family History   Problem Relation Age of Onset   • Diabetes Mother    • Coronary artery disease Paternal Grandfather    • Diabetes Paternal Grandmother    • Stroke Maternal Grandfather    • Coronary artery disease Maternal Grandfather    • Diabetes Maternal  Grandfather        Social History     Socioeconomic History   • Marital status: Single   Tobacco Use   • Smoking status: Never Smoker   • Smokeless tobacco: Never Used   Substance and Sexual Activity   • Alcohol use: Yes   • Drug use: Never   • Sexual activity: Not Currently     Partners: Male           Objective   Physical Exam  Vitals and nursing note reviewed.   Constitutional:       Appearance: Normal appearance. She is well-developed. She is not toxic-appearing.   HENT:      Head: Normocephalic and atraumatic.      Nose: Nose normal.      Mouth/Throat:      Mouth: Mucous membranes are moist.   Eyes:      General: Lids are normal.      Extraocular Movements: Extraocular movements intact.      Conjunctiva/sclera: Conjunctivae normal.      Pupils: Pupils are equal, round, and reactive to light.   Neck:      Trachea: Trachea normal.   Cardiovascular:      Rate and Rhythm: Regular rhythm.      Pulses: Normal pulses.      Heart sounds: Normal heart sounds.   Pulmonary:      Effort: Pulmonary effort is normal. No respiratory distress.      Breath sounds: Normal breath sounds. No decreased breath sounds, wheezing, rhonchi or rales.   Abdominal:      General: Bowel sounds are normal.      Palpations: Abdomen is soft.      Tenderness: There is abdominal tenderness (upper abdominal pain).   Musculoskeletal:         General: Normal range of motion.      Cervical back: Full passive range of motion without pain and normal range of motion.   Skin:     General: Skin is warm and dry.      Findings: No rash.   Neurological:      Mental Status: She is alert and oriented to person, place, and time.      Cranial Nerves: No cranial nerve deficit.   Psychiatric:         Speech: Speech normal.         Behavior: Behavior normal. Behavior is cooperative.         Procedures           ED Course  ED Course as of 03/22/22 1558   Tue Mar 22, 2022   1055 WBC: 10.47 [KG]   1418 Results are discussed with patient at this time.  Patient will be  discharged home.  Patient to follow-up with general surgery.  Patient to return if symptoms worsen.  Patient agrees and verbalized understanding. [KG]      ED Course User Index  [KG] Tabatha Irbykristen DONAHUE, JASPAL           Recent Results (from the past 24 hour(s))   Urinalysis With Microscopic If Indicated (No Culture) - Urine, Clean Catch    Collection Time: 03/22/22  9:37 AM    Specimen: Urine, Clean Catch   Result Value Ref Range    Color, UA Yellow Yellow, Straw    Appearance, UA Cloudy (A) Clear    pH, UA 7.5 5.0 - 8.0    Specific Gravity, UA 1.029 1.001 - 1.030    Glucose, UA Negative Negative    Ketones, UA >=160 mg/dL (4+) (A) Negative    Bilirubin, UA Negative Negative    Blood, UA Negative Negative    Protein, UA 30 mg/dL (1+) (A) Negative    Leuk Esterase, UA Negative Negative    Nitrite, UA Negative Negative    Urobilinogen, UA 1.0 E.U./dL 0.2 - 1.0 E.U./dL   Urinalysis, Microscopic Only - Urine, Clean Catch    Collection Time: 03/22/22  9:37 AM    Specimen: Urine, Clean Catch   Result Value Ref Range    RBC, UA 7-12 (A) None Seen, 0-2 /HPF    WBC, UA 0-2 None Seen, 0-2 /HPF    Bacteria, UA None Seen None Seen, Trace /HPF    Squamous Epithelial Cells, UA 3-6 (A) None Seen, 0-2 /HPF    Hyaline Casts, UA 0-6 0 - 6 /LPF    Methodology Automated Microscopy    POC Pregnancy, Urine    Collection Time: 03/22/22  9:40 AM    Specimen: Urine   Result Value Ref Range    HCG, Urine, QL Negative Negative    Lot Number [id number]     Internal Positive Control Positive Positive, Passed    Internal Negative Control Negative Negative, Passed    Expiration Date 3/31/23    Comprehensive Metabolic Panel    Collection Time: 03/22/22 10:11 AM    Specimen: Blood   Result Value Ref Range    Glucose 102 (H) 65 - 99 mg/dL    BUN 8 6 - 20 mg/dL    Creatinine 0.57 0.57 - 1.00 mg/dL    Sodium 139 136 - 145 mmol/L    Potassium 3.5 3.5 - 5.2 mmol/L    Chloride 106 98 - 107 mmol/L    CO2 21.0 (L) 22.0 - 29.0 mmol/L    Calcium 9.3 8.6 - 10.5  mg/dL    Total Protein 6.9 6.0 - 8.5 g/dL    Albumin 4.40 3.50 - 5.20 g/dL    ALT (SGPT) 10 1 - 33 U/L    AST (SGOT) 13 1 - 32 U/L    Alkaline Phosphatase 90 39 - 117 U/L    Total Bilirubin 0.6 0.0 - 1.2 mg/dL    Globulin 2.5 gm/dL    A/G Ratio 1.8 g/dL    BUN/Creatinine Ratio 14.0 7.0 - 25.0    Anion Gap 12.0 5.0 - 15.0 mmol/L    eGFR 131.1 >60.0 mL/min/1.73   Lipase    Collection Time: 03/22/22 10:11 AM    Specimen: Blood   Result Value Ref Range    Lipase 23 13 - 60 U/L   CBC Auto Differential    Collection Time: 03/22/22 10:11 AM    Specimen: Blood   Result Value Ref Range    WBC 10.47 3.40 - 10.80 10*3/mm3    RBC 4.80 3.77 - 5.28 10*6/mm3    Hemoglobin 12.3 12.0 - 15.9 g/dL    Hematocrit 37.9 34.0 - 46.6 %    MCV 79.0 79.0 - 97.0 fL    MCH 25.6 (L) 26.6 - 33.0 pg    MCHC 32.5 31.5 - 35.7 g/dL    RDW 16.3 (H) 12.3 - 15.4 %    RDW-SD 46.5 37.0 - 54.0 fl    MPV 10.4 6.0 - 12.0 fL    Platelets 264 140 - 450 10*3/mm3    Neutrophil % 79.4 (H) 42.7 - 76.0 %    Lymphocyte % 15.3 (L) 19.6 - 45.3 %    Monocyte % 4.4 (L) 5.0 - 12.0 %    Eosinophil % 0.2 (L) 0.3 - 6.2 %    Basophil % 0.3 0.0 - 1.5 %    Immature Grans % 0.4 0.0 - 0.5 %    Neutrophils, Absolute 8.32 (H) 1.70 - 7.00 10*3/mm3    Lymphocytes, Absolute 1.60 0.70 - 3.10 10*3/mm3    Monocytes, Absolute 0.46 0.10 - 0.90 10*3/mm3    Eosinophils, Absolute 0.02 0.00 - 0.40 10*3/mm3    Basophils, Absolute 0.03 0.00 - 0.20 10*3/mm3    Immature Grans, Absolute 0.04 0.00 - 0.05 10*3/mm3    nRBC 0.0 0.0 - 0.2 /100 WBC     Note: In addition to lab results from this visit, the labs listed above may include labs taken at another facility or during a different encounter within the last 24 hours. Please correlate lab times with ED admission and discharge times for further clarification of the services performed during this visit.    US Gallbladder   Final Result   1.  Cholelithiasis without ultrasound findings of cholecystitis.       This report was finalized on 3/22/2022 1:37  PM by Rafy Phillip MD.          CT Abdomen Pelvis With Contrast   Final Result   1.  Minimal right basilar atelectasis.   2.  Cholelithiasis without CT findings of cholecystitis   3.  Hypodense area within the right lobe the liver that could relate to   hemangioma. A nonemergent MRI of the liver at some point could be   obtained in further assessment.   4.  Adnexal cystic areas largest on the left that could relate to   physiologic cysts of the ovaries. The need for additional workup of the   adnexal areas should be based on clinical findings. There is some   minimal free fluid within the pelvis.   5.  Degenerative change lower lumbar spine.       This report was finalized on 3/22/2022 11:32 AM by Rafy Phillip MD.          CT Angiogram Chest   Final Result   1.  Minimal right basilar atelectasis.   2.  Cholelithiasis without CT findings of cholecystitis   3.  Hypodense area within the right lobe the liver that could relate to   hemangioma. A nonemergent MRI of the liver at some point could be   obtained in further assessment.   4.  Adnexal cystic areas largest on the left that could relate to   physiologic cysts of the ovaries. The need for additional workup of the   adnexal areas should be based on clinical findings. There is some   minimal free fluid within the pelvis.   5.  Degenerative change lower lumbar spine.       This report was finalized on 3/22/2022 11:32 AM by Rafy Phillip MD.            Vitals:    03/22/22 1114 03/22/22 1130 03/22/22 1400 03/22/22 1429   BP: 139/93 136/97 134/91 134/91   BP Location:       Patient Position:       Pulse:    63   Resp:    16   Temp:       TempSrc:       SpO2: 97%  100% 100%   Weight:       Height:         Medications   sodium chloride 0.9 % flush 10 mL (has no administration in time range)   sodium chloride 0.9 % bolus 1,000 mL (0 mL Intravenous Stopped 3/22/22 1121)   HYDROmorphone (DILAUDID) injection 1 mg (1 mg Intravenous Given 3/22/22 1015)   ondansetron (ZOFRAN)  injection 4 mg (4 mg Intravenous Given 3/22/22 1016)   iopamidol (ISOVUE-370) 76 % injection 100 mL (100 mL Intravenous Given 3/22/22 1107)   HYDROmorphone (DILAUDID) injection 1 mg (1 mg Intravenous Given 3/22/22 1222)   ondansetron (ZOFRAN) injection 4 mg (4 mg Intravenous Given 3/22/22 1356)     ECG/EMG Results (last 24 hours)     ** No results found for the last 24 hours. **        No orders to display                               KARLEE reviewed by Sidney Moss MD             [facility]    Final diagnoses:   Calculus of gallbladder without cholecystitis without obstruction       ED Disposition  ED Disposition     ED Disposition   Discharge    Condition   Stable    Comment   --             Dimitrios Neal MD  [address] [id number] [phone]               Medication List      New Prescriptions    HYDROcodone-acetaminophen 5-325 MG per tablet  Commonly known as: NORCO  Take 1 tablet by mouth Every 6 (Six) Hours As Needed for Moderate Pain .           Where to Get Your Medications      These medications were sent to [facility] [address] [place] [address] - [address] [place] [address] [address] - [phone]  - [phone] [address] [id number]    Phone: [phone]   · HYDROcodone-acetaminophen 5-325 MG per tablet          Shy Irby, APRN  03/22/22 1550